# Patient Record
Sex: MALE | Race: WHITE
[De-identification: names, ages, dates, MRNs, and addresses within clinical notes are randomized per-mention and may not be internally consistent; named-entity substitution may affect disease eponyms.]

---

## 2020-06-17 ENCOUNTER — HOSPITAL ENCOUNTER (EMERGENCY)
Dept: HOSPITAL 33 - ED | Age: 45
Discharge: HOME | End: 2020-06-17
Payer: MEDICARE

## 2020-06-17 VITALS — OXYGEN SATURATION: 97 % | HEART RATE: 88 BPM | DIASTOLIC BLOOD PRESSURE: 72 MMHG | SYSTOLIC BLOOD PRESSURE: 148 MMHG

## 2020-06-17 DIAGNOSIS — K42.9: ICD-10-CM

## 2020-06-17 DIAGNOSIS — G89.29: ICD-10-CM

## 2020-06-17 DIAGNOSIS — M54.5: Primary | ICD-10-CM

## 2020-06-17 PROCEDURE — 99283 EMERGENCY DEPT VISIT LOW MDM: CPT

## 2020-06-17 NOTE — ERPHSYRPT
- History of Present Illness


Time Seen by Provider: 06/17/20 11:15


Source: patient


Exam Limitations: no limitations


Physician History: 





This is a 44-year-old white male who has chronic back pain problems as well as 

umbilical hernia and presents with exacerbation of his lower back pain.  Patient

did not have any type of traumatic injury or fall.  Patient was cutting and 

moving firewood at a friend's house.  His symptoms have been worsened over the 

last couple days.  Patient is originally from [place].  He has been in [place] 

for 9 months.  He is unable to obtain comfort for his exacerbation of back pain.

 Patient states that he cannot take Percocet or tramadol.  Patient states that 

he is use Excedrin and naproxen without any benefit.  Patient drove himself to 

the emergency department today.


Timing/Duration: day(s) (2)


Method of Injury: lifting, twisted


Quality: aching, cramping


Back Pain Location: lumbar spine


Severity of Pain-Max: moderate


Severity of Pain-Current: moderate


Modifying Factors: Improves With: movement


Associated Symptoms: lower back pain, muscle spasms


Previous symptoms: same symptoms as today


Allergies/Adverse Reactions: 








carisoprodol Adverse Reaction (Verified 06/17/20 11:32)


   


cyclobenzaprine [From Flexeril] Adverse Reaction (Verified 06/17/20 11:32)


   


tramadol Adverse Reaction (Verified 06/17/20 11:32)


   








Travel Risk





- International Travel


Have you traveled outside of the country in past 3 weeks: No





- Coronavirus Screening


Are you exhibiting any of the following symptoms?: No


Close contact with a COVID-19 positive Pt in past 14-21 Days: No





- Review of Systems


Constitutional: No Symptoms


Eyes: No Symptoms


Ears, Nose, & Throat: No Symptoms


Respiratory: No Symptoms


Cardiac: No Symptoms


Abdominal/Gastrointestinal: No Symptoms


Genitourinary Symptoms: No Symptoms


Musculoskeletal: Back Pain


Skin: No Symptoms


Neurological: No Symptoms


Psychological: No Symptoms


Endocrine: No Symptoms


Hematologic/Lymphatic: No Symptoms


Immunological/Allergic: No Symptoms


All Other Systems: Reviewed and Negative





- Past Medical History


Neurological History: No Pertinent History


ENT History: No Pertinent History


Cardiac History: No Pertinent History


Respiratory History: No Pertinent History


Endocrine Medical History: No Pertinent History


Musculoskeletal History: No Pertinent History


GI Medical History: No Pertinent History


 History: No Pertinent History


Psycho-Social History: No Pertinent History


Male Reproductive Disorders: No Pertinent History





- Past Surgical History


Neuro Surgical History: No Pertinent History


Cardiac: No Pertinent History


Respiratory: No Pertinent History


Gastrointestinal: No Pertinent History


Genitourinary: No Pertinent History


Musculoskeletal: No Pertinent History


Male Surgical History: No Pertinent History





- Nursing Vital Signs


Nursing Vital Signs: 


                               Initial Vital Signs











Temperature  98.1 F   06/17/20 11:13


 


Pulse Rate  88   06/17/20 11:13


 


Respiratory Rate  18   06/17/20 11:13


 


Blood Pressure  148/72   06/17/20 11:13


 


O2 Sat by Pulse Oximetry  97   06/17/20 11:13








                                   Pain Scale











Pain Intensity [Lower Back]    8


 


Pain Intensity                 8

















- Physical Exam


General Appearance: no apparent distress, alert, anxiety


Eye Exam: PERRL/EOMI, eyes nml inspection


Ears, Nose, Throat Exam: normal ENT inspection, moist mucous membranes


Neck Exam: normal inspection, non-tender, supple, full range of motion


Respiratory Exam: normal breath sounds, lungs clear, airway intact, No chest 

tenderness, No respiratory distress


Cardiovascular Exam: regular rate/rhythm, normal heart sounds, normal peripheral

pulses


Gastrointestinal Exam: No tenderness


Rectal Exam: not done


Back Exam: normal inspection, normal range of motion, muscle spasm, No CVA 

tenderness, No vertebral tenderness


Extremity Exam: normal inspection, normal range of motion, pelvis stable


Neurologic Exam: alert, oriented x 3, cooperative, CNs II-XII nml as tested, 

normal mood/affect, nml cerebellar function, nml station & gait


Skin Exam: normal color, warm, dry


Lymphatic Exam: No adenopathy


**SpO2 Interpretation**: normal


O2 Delivery: Room Air





- Course


Nursing assessment & vital signs reviewed: Yes





- Progress


Progress: unchanged


Counseled pt/family regarding: diagnosis, need for follow-up, rad results





- Departure


Departure Disposition: Home


Clinical Impression: 


 Acute exacerbation of chronic low back pain





Condition: Stable


Critical Care Time: No


Additional Instructions: 


Follow-up with your primary care physician for further management of your back 

pain


Prescriptions: 


Prednisone 10 mg*** [Deltasone 10 mg***] 10 mg PO TID #12 tablet


Diazepam [Valium] 2 mg PO BID #8 tablet

## 2023-04-05 ENCOUNTER — HOSPITAL ENCOUNTER (EMERGENCY)
Dept: HOSPITAL 33 - ED | Age: 48
Discharge: LEFT BEFORE DISCHARGE | End: 2023-04-05
Payer: MEDICARE

## 2023-04-05 VITALS — HEART RATE: 88 BPM | SYSTOLIC BLOOD PRESSURE: 117 MMHG | DIASTOLIC BLOOD PRESSURE: 72 MMHG | OXYGEN SATURATION: 98 %

## 2023-04-05 DIAGNOSIS — Z03.89: Primary | ICD-10-CM

## 2023-04-05 PROCEDURE — 99282 EMERGENCY DEPT VISIT SF MDM: CPT

## 2023-04-05 PROCEDURE — G0463 HOSPITAL OUTPT CLINIC VISIT: HCPCS

## 2023-04-05 PROCEDURE — 82947 ASSAY GLUCOSE BLOOD QUANT: CPT
